# Patient Record
(demographics unavailable — no encounter records)

---

## 2025-03-21 NOTE — DISCUSSION/SUMMARY
[de-identified] : Plan for PT Advised to bring her medical records/operative reports/ imaging CD ----------------------------------------------------------------------------   All relevant imaging studies pertinent to today's visit, including x-rays, MRI's and/or other advanced imaging studies (CT/etc) were independently interpreted and reviewed with the patient as needed. Implications of the studies together with the patient's clinical picture were discussed to formulate a working diagnosis and management options were detailed.   The patient and/or guardian was advised of the diagnosis.  The natural history of the pathology was explained in full. All questions were answered.  The risks and benefits of conservative and interventional treatment alternatives were explained to the patient   The patient and/or guardian was advised if any advanced diagnostic/imaging study (MRI/CT/etc) is ordered to evaluate potential pathology in the affected area(s), they should follow up in the office to review the results of the study and determine further management that may be indicated.

## 2025-03-21 NOTE — IMAGING
[Disc space narrowing] : Disc space narrowing [Left] : left shoulder [Degenerative change] : Degenerative change [Type 2 acromion] : Type 2 acromion [de-identified] :   ----------------------------------------------------------------------------   Left shoulder exam:    Skin: healed incision   Inspection: no obvious deformity, no obvious masses, no swelling, no effusion, no atrophy  ROM:     FF: 170 v 180     ER: 70     IR: L5  Tenderness:     (+) Anterior/Biceps:     (+) Posterior     (neg) Lateral     (+) Trapezius     (+) Scapula     (neg) AC joint     (neg) Crepitus with ROM  Stability:     (neg) Translation     (neg) Apprehension     (neg) Clicking  Additional tests:     (+) Neer's     (+) Hawkin's     (neg) Horta's     (neg) Speed     (neg) Cross chest adduction    (+) clicking with shoulder ROM  Strength:     FF: 5/5     ER: 5/5     IR: 5/5     Biceps: 5/5     Triceps: 5/5     Distal: 5/5  Neuro: In tact to light touch throughout  Vascularity: Extremity warm and well perfused     ----------------------------------------------------------------------------   Cervical spine exam:   Inspection:        (neg) Abnormal alignment (kyphosis/lordosis)   (neg) Atrophy ROM:    Pain:               (neg) Flexion/extension    (neg) Rotation    Stiffness:        (neg) Flexion/extension    (neg) Rotation Tenderness:    Trapezial:       (neg) Right    (neg) Left    (neg) Midline    Paraspinal:     (neg) Right    (neg) Left    Rhomboid :     (neg) Right    (neg) Left    Scapula:         (neg) Right   (neg) Left Strength:    Deltoid:          Right: 5/5   .  Left: 5/5    Biceps:          Right: 5/5   .  Left: 5/5    Triceps:         Right: 5/5   .  Left: 5/5    Wrist flex      Right: 5/5   .   Left: 5/5    Wrist ext:      Right: 5/5   .   Left: 5/5    Hand:            Right: 5/5   .   Left: 5/5 Neuro: DTR's wnl.  Sensation to light touch grossly in tact in all distributions.    (neg) Charles's    (neg) Spurling    (neg) Lhermitte Vascularity: Extremity warm and well perfused Gait: normal   [FreeTextEntry1] : mild inferior humeral spur
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
